# Patient Record
Sex: FEMALE | Race: WHITE
[De-identification: names, ages, dates, MRNs, and addresses within clinical notes are randomized per-mention and may not be internally consistent; named-entity substitution may affect disease eponyms.]

---

## 2018-10-23 ENCOUNTER — HOSPITAL ENCOUNTER (EMERGENCY)
Dept: HOSPITAL 80 - CED | Age: 33
LOS: 1 days | Discharge: HOME | End: 2018-10-24
Payer: COMMERCIAL

## 2018-10-23 DIAGNOSIS — F41.0: Primary | ICD-10-CM

## 2018-10-24 VITALS — DIASTOLIC BLOOD PRESSURE: 91 MMHG | SYSTOLIC BLOOD PRESSURE: 142 MMHG

## 2018-10-24 NOTE — EDPHY
H & P


Stated Complaint: Short of breath, history of Panic Disorder.


Time Seen by Provider: 10/24/18 00:18


HPI/ROS: 





33-year-old female presents complaining of sudden onset around 11:00 p.m. Of 

panic attack.


Today is the anniversary of her father's death and she was out with friends and 

suddenly ran outside feeling very panicked.


She denies chest pain. She admits she is having a hard time calming down and 

this is very similar to prior panic attacks she has had.


No leg swelling or calf pain.











Review of systems


As per HPI


General no fever no chills no weakness


HEENT no eye pain no eye discharge. No eye redness, no sore throat


Respiratory no cough, no shortness of breath


Cardiac no chest pain, no peripheral edema


GI no abdominal pain, no diarrhea, no constipation, no nausea, no vomiting


  no flank pain, no hematuria, no dysuria


Musculoskeletal no myalgias, no joint pain


Heme  no easy bruising, no easy bleeding


Endo no polyuria, no polydipsia


Skin no rashes, no pruritus


Neuro no syncope, no dizziness, no headaches


Psych is no suicidal ideation, no homicidal ideation





Source: Patient


Exam Limitations: No limitations





- Personal History


Current Tetanus/Diphtheria Vaccine: Unsure


Current Tetanus Diphtheria and Acellular Pertussis (TDAP): Unsure





- Medical/Surgical History


Hx Asthma: No


Hx Chronic Respiratory Disease: No


Hx Diabetes: No


Hx Cardiac Disease: No


Hx Renal Disease: No


Hx Cirrhosis: No


Hx Alcoholism: No


Hx HIV/AIDS: No


Hx Splenectomy or Spleen Trauma: No


Other PMH: Panic disorder, depression, anxiety.





- Family History


Significant Family History: No pertinent family hx





- Social History


Smoking Status: Heavy smoker


Alcohol Use: Occasionally


Drug Use: None





- Physical Exam


Exam: 





33-year-old female alert and oriented anxious, pressured speech


Vital signs stable


Normal oxygen saturation


HEENT atraumatic normocephalic, extraocular muscles intact, anicteric


Oropharynx negative for erythema negative exudate, tolerating her own secretions


Neck supple no meningismus


Lungs clear to auscultation bilaterally


Heart regular rate and rhythm without murmur rub or gallop


Abdomen nondistended normoactive bowel sounds soft nontender


Back no CVA tenderness, no step-offs, no spinal tenderness


Extremities no cyanosis clubbing or edema


Neuro alert and oriented, no focal deficits


Constitutional: 


 Initial Vital Signs











Temperature (C)  37.3 C   10/24/18 00:05


 


Heart Rate  97   10/24/18 00:05


 


Respiratory Rate  16   10/24/18 00:05


 


Blood Pressure  142/91 H  10/24/18 00:05


 


O2 Sat (%)  96   10/24/18 00:05








 











O2 Delivery Mode               Room Air














Allergies/Adverse Reactions: 


 





No Known Allergies Allergy (Unverified 10/24/18 00:16)


 








Home Medications: 














 Medication  Instructions  Recorded


 


Venlafaxine HCl  10/24/18














Medical Decision Making


ED Course/Re-evaluation: 





Patient seen and evaluated for sudden onset of panic attack.





Physical exam normal, vital signs stable


Patient given lorazepam 1 mg p.o. with marked relief in symptoms.





Imp


Panic attack 


pt with hx of panic disorder





plan


home


return as needed


Differential Diagnosis: 





differential diagnosis considered not limited to:


anxiety, panic disorder, substance abuse, altitude exposure, dehydration





- Data Points


Medications Given: 


 








Discontinued Medications





Lorazepam (Ativan)  1 mg PO EDNOW ONE


   Stop: 10/24/18 00:28


   Last Admin: 10/24/18 00:33 Dose:  1 mg


Lorazepam (Ativan 1 Mg Prepack#4)  1 btl TAKEHOME EDNOW ONE


   Stop: 10/24/18 01:08


   Last Admin: 10/24/18 01:12 Dose:  1 btl








Departure





- Departure


Disposition: Home, Routine, Self-Care


Clinical Impression: 


 Panic attack





Condition: Good


Instructions:  Lorazepam (By mouth), Panic Attack (ED)


Referrals: 


Yazmin Wolf MD [Medical Doctor] - As per Instructions